# Patient Record
Sex: FEMALE | Race: ASIAN
[De-identification: names, ages, dates, MRNs, and addresses within clinical notes are randomized per-mention and may not be internally consistent; named-entity substitution may affect disease eponyms.]

---

## 2019-03-08 ENCOUNTER — APPOINTMENT (OUTPATIENT)
Dept: CARDIOLOGY | Facility: CLINIC | Age: 74
End: 2019-03-08
Payer: MEDICAID

## 2019-03-08 VITALS
TEMPERATURE: 98.1 F | HEART RATE: 60 BPM | BODY MASS INDEX: 29.62 KG/M2 | SYSTOLIC BLOOD PRESSURE: 194 MMHG | DIASTOLIC BLOOD PRESSURE: 76 MMHG | WEIGHT: 128 LBS | OXYGEN SATURATION: 96 % | HEIGHT: 55 IN | RESPIRATION RATE: 17 BRPM

## 2019-03-08 DIAGNOSIS — J45.909 UNSPECIFIED ASTHMA, UNCOMPLICATED: ICD-10-CM

## 2019-03-08 DIAGNOSIS — E78.5 HYPERLIPIDEMIA, UNSPECIFIED: ICD-10-CM

## 2019-03-08 DIAGNOSIS — R06.02 SHORTNESS OF BREATH: ICD-10-CM

## 2019-03-08 PROBLEM — Z00.00 ENCOUNTER FOR PREVENTIVE HEALTH EXAMINATION: Status: ACTIVE | Noted: 2019-03-08

## 2019-03-08 PROCEDURE — 99204 OFFICE O/P NEW MOD 45 MIN: CPT

## 2019-03-08 PROCEDURE — 93306 TTE W/DOPPLER COMPLETE: CPT

## 2019-03-08 RX ORDER — LOSARTAN POTASSIUM 100 MG/1
100 TABLET, FILM COATED ORAL DAILY
Qty: 30 | Refills: 5 | Status: ACTIVE | COMMUNITY
Start: 2019-03-08 | End: 1900-01-01

## 2019-03-14 PROBLEM — E78.5 HLD (HYPERLIPIDEMIA): Status: ACTIVE | Noted: 2019-03-14

## 2019-03-14 PROBLEM — J45.909 ASTHMA: Status: ACTIVE | Noted: 2019-03-14

## 2019-03-14 RX ORDER — AMLODIPINE BESYLATE 5 MG/1
5 TABLET ORAL
Refills: 0 | Status: ACTIVE | COMMUNITY

## 2019-03-14 NOTE — REASON FOR VISIT
[Follow-Up - Clinic] : a clinic follow-up of [Chest Pain] : chest pain [Hypertension] : hypertension

## 2019-03-15 ENCOUNTER — APPOINTMENT (OUTPATIENT)
Dept: CARDIOLOGY | Facility: CLINIC | Age: 74
End: 2019-03-15
Payer: MEDICAID

## 2019-03-15 VITALS
RESPIRATION RATE: 18 BRPM | TEMPERATURE: 98.1 F | WEIGHT: 126 LBS | DIASTOLIC BLOOD PRESSURE: 71 MMHG | HEART RATE: 72 BPM | BODY MASS INDEX: 33.31 KG/M2 | OXYGEN SATURATION: 95 % | SYSTOLIC BLOOD PRESSURE: 127 MMHG

## 2019-03-15 DIAGNOSIS — I10 ESSENTIAL (PRIMARY) HYPERTENSION: ICD-10-CM

## 2019-03-15 PROCEDURE — 99213 OFFICE O/P EST LOW 20 MIN: CPT

## 2019-03-15 RX ORDER — BLOOD-GLUCOSE METER
KIT MISCELLANEOUS
Qty: 1 | Refills: 0 | Status: ACTIVE | COMMUNITY
Start: 2019-03-15 | End: 1900-01-01

## 2019-03-26 PROBLEM — I10 HTN (HYPERTENSION): Status: ACTIVE | Noted: 2019-03-08

## 2019-05-07 NOTE — DISCUSSION/SUMMARY
[FreeTextEntry1] : 73 year-old female with asthma, HTN and HLD presents for followup.  Patient was last seen on 3/8/19 for evaluation of chest discomfort, abnormal ECG, and elevated BP.  Patient underwent an echocardiogram and it showed normal LV function without significant valvular pathology.  She wasn't sure that she was taking her usual Losartan 100 mg so I advised patient to resume Losartan.  Now she thinks that she has been taking it.  Her Amlodipine has been increased to 10 mg by PCP.  Patient denies CP now.\par \par (1) CP - Her CP has resolved.\par \par (2) HTN - Her BP was good today.  She should continue Losartan 100 mg and Amlodipine 10 mg.  If her LE edema becomes bothersome, HCTZ 12.5 mg can be added to Losartan 100 mg and Amlodipine dose can be lowered.\par \par (3) Followup - as needed.

## 2019-05-07 NOTE — HISTORY OF PRESENT ILLNESS
[FreeTextEntry1] : 73 year-old female with asthma, HTN and HLD presents for evaluation of chest discomfort, abnormal ECG, and elevated BP.  Patient reports that for the past week she has been experiencing SSCP not related to exertion.  Patient denies SOB.  Patient denies palpitations.  Patient denies h/o syncope.  She was noted to have elevated BP when she went for Hep B vaccination.  Patient was noted to have an abnormal ECG by PCP, showing inverted T waves.

## 2019-05-07 NOTE — PHYSICAL EXAM
[General Appearance - Well Developed] : well developed [Normal Appearance] : normal appearance [Well Groomed] : well groomed [General Appearance - Well Nourished] : well nourished [No Deformities] : no deformities [General Appearance - In No Acute Distress] : no acute distress [Normal Conjunctiva] : the conjunctiva exhibited no abnormalities [Eyelids - No Xanthelasma] : the eyelids demonstrated no xanthelasmas [Normal Oral Mucosa] : normal oral mucosa [No Oral Pallor] : no oral pallor [No Oral Cyanosis] : no oral cyanosis [Normal Jugular Venous A Waves Present] : normal jugular venous A waves present [Normal Jugular Venous V Waves Present] : normal jugular venous V waves present [No Jugular Venous Eduardo A Waves] : no jugular venous eduardo A waves [Respiration, Rhythm And Depth] : normal respiratory rhythm and effort [Exaggerated Use Of Accessory Muscles For Inspiration] : no accessory muscle use [Auscultation Breath Sounds / Voice Sounds] : lungs were clear to auscultation bilaterally [Heart Rate And Rhythm] : heart rate and rhythm were normal [Heart Sounds] : normal S1 and S2 [Murmurs] : no murmurs present [Arterial Pulses Normal] : the arterial pulses were normal [Abdomen Soft] : soft [Abdomen Tenderness] : non-tender [Abdomen Mass (___ Cm)] : no abdominal mass palpated [Abnormal Walk] : normal gait [Gait - Sufficient For Exercise Testing] : the gait was sufficient for exercise testing [Nail Clubbing] : no clubbing of the fingernails [Cyanosis, Localized] : no localized cyanosis [Petechial Hemorrhages (___cm)] : no petechial hemorrhages [] : no ischemic changes [Oriented To Time, Place, And Person] : oriented to person, place, and time [Affect] : the affect was normal [Mood] : the mood was normal [No Anxiety] : not feeling anxious [FreeTextEntry1] : 1+ LE edema noted.

## 2019-05-07 NOTE — HISTORY OF PRESENT ILLNESS
[FreeTextEntry1] : 73 year-old female with asthma, HTN and HLD presents for followup.  Patient was last seen on 3/8/19 for evaluation of chest discomfort, abnormal ECG, and elevated BP.  Patient underwent an echocardiogram and it showed normal LV function without significant valvular pathology.  She wasn't sure that she was taking her usual Losartan 100 mg so I advised patient to resume Losartan.  Now she thinks that she has been taking it.  Her Amlodipine has been increased to 10 mg by PCP.  Patient denies CP now.\par

## 2019-05-07 NOTE — PHYSICAL EXAM
[General Appearance - Well Developed] : well developed [Normal Appearance] : normal appearance [Well Groomed] : well groomed [General Appearance - Well Nourished] : well nourished [No Deformities] : no deformities [General Appearance - In No Acute Distress] : no acute distress [Normal Conjunctiva] : the conjunctiva exhibited no abnormalities [Eyelids - No Xanthelasma] : the eyelids demonstrated no xanthelasmas [Normal Oral Mucosa] : normal oral mucosa [No Oral Pallor] : no oral pallor [No Oral Cyanosis] : no oral cyanosis [Normal Jugular Venous A Waves Present] : normal jugular venous A waves present [Normal Jugular Venous V Waves Present] : normal jugular venous V waves present [No Jugular Venous Eduardo A Waves] : no jugular venous eduardo A waves [Heart Rate And Rhythm] : heart rate and rhythm were normal [Heart Sounds] : normal S1 and S2 [Murmurs] : no murmurs present [Arterial Pulses Normal] : the arterial pulses were normal [Respiration, Rhythm And Depth] : normal respiratory rhythm and effort [Exaggerated Use Of Accessory Muscles For Inspiration] : no accessory muscle use [Auscultation Breath Sounds / Voice Sounds] : lungs were clear to auscultation bilaterally [Abdomen Soft] : soft [Abdomen Tenderness] : non-tender [Abdomen Mass (___ Cm)] : no abdominal mass palpated [Abnormal Walk] : normal gait [Gait - Sufficient For Exercise Testing] : the gait was sufficient for exercise testing [Nail Clubbing] : no clubbing of the fingernails [Cyanosis, Localized] : no localized cyanosis [Petechial Hemorrhages (___cm)] : no petechial hemorrhages [] : no ischemic changes [Oriented To Time, Place, And Person] : oriented to person, place, and time [Affect] : the affect was normal [Mood] : the mood was normal [No Anxiety] : not feeling anxious [FreeTextEntry1] : 1+ LE edema noted.

## 2019-05-07 NOTE — DISCUSSION/SUMMARY
[FreeTextEntry1] : 73 year-old female with asthma, HTN and HLD presents for evaluation of chest discomfort, abnormal ECG, and elevated BP.  Patient reports that for the past week she has been experiencing SSCP not related to exertion.  Patient denies SOB.  Patient denies palpitations.  Patient denies h/o syncope.  She was noted to have elevated BP when she went for Hep B vaccination.  Patient was noted to have an abnormal ECG by PCP, showing inverted T waves.\par \par (1) CP - Patient underwent an echocardiogram and it showed normal LV function without significant valvular pathology.  I will consider stress testing once her BP is under better control if her symptom persists.\par \par (2) HTN - Her BP was markedly elevated.  She is on Amlodipine 5 mg.  She used to take Losartan 100 mg but she is not sure why she is not taking it anymore.  I advised patient to resume Losartan 100 mg.  Her PCP had prescribed Hydralazine 25 mg TID earlier today which I asked her to hold for now.\par \par (3) Followup - 1 week.